# Patient Record
Sex: FEMALE | Race: BLACK OR AFRICAN AMERICAN | NOT HISPANIC OR LATINO | URBAN - METROPOLITAN AREA
[De-identification: names, ages, dates, MRNs, and addresses within clinical notes are randomized per-mention and may not be internally consistent; named-entity substitution may affect disease eponyms.]

---

## 2019-02-15 ENCOUNTER — EMERGENCY (EMERGENCY)
Facility: HOSPITAL | Age: 69
LOS: 1 days | Discharge: HOME | End: 2019-02-15
Attending: EMERGENCY MEDICINE | Admitting: EMERGENCY MEDICINE
Payer: MEDICARE

## 2019-02-15 VITALS
OXYGEN SATURATION: 98 % | TEMPERATURE: 98 F | RESPIRATION RATE: 18 BRPM | DIASTOLIC BLOOD PRESSURE: 63 MMHG | HEART RATE: 80 BPM | SYSTOLIC BLOOD PRESSURE: 149 MMHG

## 2019-02-15 VITALS
OXYGEN SATURATION: 100 % | RESPIRATION RATE: 16 BRPM | HEART RATE: 66 BPM | SYSTOLIC BLOOD PRESSURE: 168 MMHG | TEMPERATURE: 98 F | DIASTOLIC BLOOD PRESSURE: 75 MMHG

## 2019-02-15 PROCEDURE — 99282 EMERGENCY DEPT VISIT SF MDM: CPT | Mod: GC

## 2019-02-15 RX ORDER — DIAZEPAM 5 MG
1 TABLET ORAL
Qty: 9 | Refills: 0 | OUTPATIENT
Start: 2019-02-15 | End: 2019-02-17

## 2019-02-15 RX ORDER — ACETAMINOPHEN 500 MG
650 TABLET ORAL ONCE
Qty: 0 | Refills: 0 | Status: COMPLETED | OUTPATIENT
Start: 2019-02-15 | End: 2019-02-15

## 2019-02-15 RX ORDER — LIDOCAINE 4 G/100G
1 CREAM TOPICAL ONCE
Qty: 0 | Refills: 0 | Status: COMPLETED | OUTPATIENT
Start: 2019-02-15 | End: 2019-02-15

## 2019-02-15 RX ORDER — DIAZEPAM 5 MG
5 TABLET ORAL ONCE
Qty: 0 | Refills: 0 | Status: DISCONTINUED | OUTPATIENT
Start: 2019-02-15 | End: 2019-02-15

## 2019-02-15 RX ORDER — LIDOCAINE 4 G/100G
1 CREAM TOPICAL
Qty: 5 | Refills: 0 | OUTPATIENT
Start: 2019-02-15 | End: 2019-02-19

## 2019-02-15 RX ADMIN — Medication 650 MILLIGRAM(S): at 22:40

## 2019-02-15 RX ADMIN — Medication 5 MILLIGRAM(S): at 22:40

## 2019-02-15 RX ADMIN — Medication 650 MILLIGRAM(S): at 23:40

## 2019-02-15 RX ADMIN — LIDOCAINE 1 PATCH: 4 CREAM TOPICAL at 22:40

## 2019-02-15 NOTE — ED ADULT TRIAGE NOTE - CHIEF COMPLAINT QUOTE
Left sided head and neck pain with swelling.  Symptoms started today.  Pt endorses an episode of epistaxis that has resolved. Pt on ASA

## 2019-02-15 NOTE — ED ADULT NURSE NOTE - NSIMPLEMENTINTERV_GEN_ALL_ED
Implemented All Universal Safety Interventions:  [place] to call system. Call bell, personal items and telephone within reach. Instruct patient to call for assistance. Room bathroom lighting operational. Non-slip footwear when patient is off stretcher. Physically safe environment: no spills, clutter or unnecessary equipment. Stretcher in lowest position, wheels locked, appropriate side rails in place.

## 2019-02-15 NOTE — ED PROVIDER NOTE - ATTENDING CONTRIBUTION TO CARE
01-Feb-2017 10:32
Dr. Esposito: I have personally seen and examined this patient at the bedside. I have fully participated in the care of this patient. I have reviewed all pertinent clinical information, including history, physical exam, plan and the Resident's note and agree except as noted. HPI above as by me. PE above as by me. DDX chronic neck pain PLAN tylenol, valium, lidoderm, reassess DISPO ok to TX home.

## 2019-02-15 NOTE — ED PROVIDER NOTE - PHYSICAL EXAMINATION
Vaibhav att: nad, aaox3, cervical full rom, ctabl, rrr, s1s2, abd soft ntnd, equal ue pulses and  strength

## 2019-02-15 NOTE — ED PROVIDER NOTE - MUSCULOSKELETAL, MLM
Spine appears normal, range of motion is not limited. ttp over lateral neck and trapezius muscle area on left.

## 2019-02-15 NOTE — ED PROVIDER NOTE - OBJECTIVE STATEMENT
69F p/w left sided neck pain. patient states she has had this pain previously over the past 4 months, goes to physical therapy for treatment intermittently. pain is worse with palpation and intermittent. no throat pain, neck stiffness. patient has had US of carotids/Echo to eval if cardiac in nature, normal as per patient. usually improves with pain medication and heat. no weakness, numbness/tingling of hands or upper ext. no trauma, HA, fever, chills. 69F p/w left sided neck pain. patient states she has had this pain previously over the past 4 months, goes to physical therapy for treatment intermittently. pain is worse with palpation and intermittent. no throat pain, neck stiffness. patient has had US of carotids/Echo to eval if cardiac in nature, normal as per patient. usually improves with pain medication and heat. no weakness, numbness/tingling of hands or upper ext. no trauma, HA, fever, chills.  23:17 [facility] att: 69F c/o left neck pain x 3 mo, p/w same pain x days. Past 3 months frequent left sided neck pain, worse with head turning, improved with PT and muscle relaxants. Last month negative carotid dopplers. Few days ago insurance stopped covering PT and patient notes her usual left neck discomfort. Denies cp, sob

## 2019-02-15 NOTE — ED ADULT NURSE NOTE - OBJECTIVE STATEMENT
Gigi RN: Patient is a 70 y/o F p/w a c/c of atraumatic left sided neck pain that worsens with turning her head, which began today.  Patient denies HA, SOB, CP, visual disturbance, abd pain, GI/ symptoms.  Patient in nad, waiting to be seen by MD.